# Patient Record
Sex: FEMALE | Race: WHITE | NOT HISPANIC OR LATINO | Employment: OTHER | URBAN - METROPOLITAN AREA
[De-identification: names, ages, dates, MRNs, and addresses within clinical notes are randomized per-mention and may not be internally consistent; named-entity substitution may affect disease eponyms.]

---

## 2021-01-26 ENCOUNTER — LAB VISIT (OUTPATIENT)
Dept: LAB | Facility: HOSPITAL | Age: 56
End: 2021-01-26
Attending: FAMILY MEDICINE
Payer: MEDICAID

## 2021-01-26 DIAGNOSIS — Z12.11 COLON CANCER SCREENING: ICD-10-CM

## 2021-01-26 PROCEDURE — 82274 ASSAY TEST FOR BLOOD FECAL: CPT

## 2021-02-01 LAB — HEMOCCULT STL QL IA: NEGATIVE

## 2022-02-10 ENCOUNTER — PATIENT MESSAGE (OUTPATIENT)
Dept: ADMINISTRATIVE | Facility: HOSPITAL | Age: 57
End: 2022-02-10
Payer: MEDICAID

## 2022-03-04 DIAGNOSIS — Z12.11 COLON CANCER SCREENING: ICD-10-CM

## 2022-03-16 DIAGNOSIS — Z12.31 OTHER SCREENING MAMMOGRAM: ICD-10-CM

## 2022-04-04 ENCOUNTER — PATIENT MESSAGE (OUTPATIENT)
Dept: ADMINISTRATIVE | Facility: HOSPITAL | Age: 57
End: 2022-04-04
Payer: MEDICAID

## 2022-10-03 ENCOUNTER — PATIENT MESSAGE (OUTPATIENT)
Dept: ADMINISTRATIVE | Facility: HOSPITAL | Age: 57
End: 2022-10-03
Payer: MEDICAID

## 2023-01-31 ENCOUNTER — PATIENT MESSAGE (OUTPATIENT)
Dept: OBSTETRICS AND GYNECOLOGY | Facility: HOSPITAL | Age: 58
End: 2023-01-31
Payer: MEDICAID

## 2023-01-31 DIAGNOSIS — Z79.890 HORMONE REPLACEMENT THERAPY (HRT): Primary | ICD-10-CM

## 2023-01-31 RX ORDER — ESTRADIOL 1 MG/1
0.5 TABLET ORAL DAILY
Qty: 60 TABLET | Refills: 2 | Status: SHIPPED | OUTPATIENT
Start: 2023-01-31 | End: 2024-01-10 | Stop reason: SDUPTHER

## 2023-01-31 RX ORDER — ESTRADIOL 1 MG/1
0.5 TABLET ORAL DAILY
Qty: 60 TABLET | Refills: 2 | Status: SHIPPED | OUTPATIENT
Start: 2023-01-31 | End: 2023-01-31

## 2023-02-01 NOTE — TELEPHONE ENCOUNTER
Embx negative. Per discussion with Dr. Daniels at last visit (see note), will start patient on PO HRT and d/c patches. Rx to pharmacy. RTC in 2mo for f/u.

## 2023-03-09 ENCOUNTER — OFFICE VISIT (OUTPATIENT)
Dept: URGENT CARE | Facility: CLINIC | Age: 58
End: 2023-03-09
Payer: MEDICAID

## 2023-03-09 VITALS
DIASTOLIC BLOOD PRESSURE: 99 MMHG | OXYGEN SATURATION: 98 % | RESPIRATION RATE: 16 BRPM | WEIGHT: 141 LBS | SYSTOLIC BLOOD PRESSURE: 152 MMHG | HEIGHT: 65 IN | TEMPERATURE: 99 F | HEART RATE: 96 BPM | BODY MASS INDEX: 23.49 KG/M2

## 2023-03-09 DIAGNOSIS — K13.0 ANGULAR CHEILITIS: Primary | ICD-10-CM

## 2023-03-09 DIAGNOSIS — K14.6 SORENESS OF TONGUE: ICD-10-CM

## 2023-03-09 PROCEDURE — 99203 OFFICE O/P NEW LOW 30 MIN: CPT | Mod: S$GLB,,,

## 2023-03-09 PROCEDURE — 99203 PR OFFICE/OUTPT VISIT, NEW, LEVL III, 30-44 MIN: ICD-10-PCS | Mod: S$GLB,,,

## 2023-03-09 RX ORDER — MUPIROCIN 20 MG/G
OINTMENT TOPICAL DAILY
Qty: 15 G | Refills: 0 | Status: SHIPPED | OUTPATIENT
Start: 2023-03-09 | End: 2023-04-03

## 2023-03-09 RX ORDER — DOXYLAMINE SUCCINATE 25 MG
TABLET ORAL 2 TIMES DAILY
Qty: 57 G | Refills: 0 | Status: SHIPPED | OUTPATIENT
Start: 2023-03-09 | End: 2023-11-03

## 2023-03-09 RX ORDER — HYDROXYZINE HYDROCHLORIDE 25 MG/1
TABLET, FILM COATED ORAL
COMMUNITY
Start: 2022-12-19 | End: 2023-11-03

## 2023-03-09 RX ORDER — FLUCONAZOLE 100 MG/1
TABLET ORAL
Qty: 15 TABLET | Refills: 0 | Status: SHIPPED | OUTPATIENT
Start: 2023-03-09 | End: 2023-04-03

## 2023-03-10 ENCOUNTER — PATIENT MESSAGE (OUTPATIENT)
Dept: OBSTETRICS AND GYNECOLOGY | Facility: CLINIC | Age: 58
End: 2023-03-10
Payer: MEDICAID

## 2023-03-10 NOTE — PROGRESS NOTES
"Subjective:       Patient ID: Humaira Izaguirre is a 57 y.o. female.    Vitals:  height is 5' 5" (1.651 m) and weight is 64 kg (141 lb). Her oral temperature is 98.7 °F (37.1 °C). Her blood pressure is 152/99 (abnormal) and her pulse is 96. Her respiration is 16 and oxygen saturation is 98%.     Chief Complaint: Facial Swelling    Other  This is a recurrent problem. The current episode started today. The problem has been gradually worsening. Pertinent negatives include no congestion, coughing, fever, headaches, nausea or vomiting. Associated symptoms comments: Lip swelling, lips burning  . Treatments tried: hydroxyzine.     Constitution: Negative for fever.   HENT:  Negative for congestion.    Respiratory:  Negative for cough.    Gastrointestinal:  Negative for nausea and vomiting.   Skin:  Positive for lesion. Negative for erythema.   Neurological:  Negative for headaches.     Objective:      Physical Exam   Constitutional: She is oriented to person, place, and time. She appears well-developed.   HENT:   Head: Normocephalic and atraumatic. Head is without abrasion, without contusion and without laceration.       Ears:   Right Ear: External ear normal.   Left Ear: External ear normal.   Nose: Nose normal.   Mouth/Throat: Oropharynx is clear and moist and mucous membranes are normal.   Eyes: Conjunctivae, EOM and lids are normal. Pupils are equal, round, and reactive to light.   Neck: Trachea normal and phonation normal. Neck supple.   Cardiovascular: Normal rate, regular rhythm and normal heart sounds.   Pulmonary/Chest: Effort normal and breath sounds normal. No stridor. No respiratory distress.   Musculoskeletal: Normal range of motion.         General: Normal range of motion.   Neurological: She is alert and oriented to person, place, and time.   Skin: Skin is warm, dry, intact and rash. Capillary refill takes less than 2 seconds. No abrasion, No burn, No bruising, No erythema and No ecchymosis   Psychiatric: " Her speech is normal and behavior is normal. Judgment and thought content normal.   Nursing note and vitals reviewed.      Assessment:       1. Angular cheilitis    2. Soreness of tongue          Plan:         Angular cheilitis  -     miconazole (MICOTIN) 2 % cream; Apply topically 2 (two) times daily.  Dispense: 57 g; Refill: 0  -     fluconazole (DIFLUCAN) 100 MG tablet; Take 2 tablets (200 mg total) by mouth once daily for 1 day, THEN 1 tablet (100 mg total) once daily for 13 days.  Dispense: 15 tablet; Refill: 0  -     mupirocin (BACTROBAN) 2 % ointment; Apply topically once daily. for 14 days  Dispense: 15 g; Refill: 0    Soreness of tongue  -     (Magic mouthwash) 1:1:1 diphenhydrAMINE(Benadryl) 12.5mg/5ml liq, aluminum & magnesium hydroxide-simethicone (Maalox), LIDOcaine viscous 2%; Swish and spit 10 mLs every 4 (four) hours as needed (mouth discomfort). for mouth sores  Dispense: 360 mL; Refill: 0            Inflammation of the angles of the mouth noted.  The creases of the mouth constantly split, crack and bleed.  Severe dry, chapped lips noted.  Fissures, scaling and erythema of the lower lip and lip creases noted.       Discussed plan of care with patient.  They voiced full understanding and are in agreement with the current plan of care.  All known questions and concerns addressed at this time.      You must understand that you have received treatment at an Urgent Care Facility only, and that you may be released before all of your medical problems are known or treated.  Urgent Care facilities are not equipped to handle life threatening emergencies.  It is recommended that you seek care at an Emergency Department for further evaluation of worsening or concerning symptoms, or possibly life threatening conditions as discussed.

## 2023-03-10 NOTE — PATIENT INSTRUCTIONS
Take daily Vitamin B12 and Zinc supplements to help restore the skin to the areas around the mouth and tongue soreness.  Use magic mouth wash daily to help with sore tongue.      - Pt instructed that they can take Tylenol, or acetaminophen for their pain. They can take up to 3,000mg per day, or 6 tablets, with a max of two tablets at a time,  as long as they do not have any liver disease.  -Pt instructed may take Motrin (ibuprofen) as needed every 8 hours for inflammation.

## 2023-12-20 DIAGNOSIS — M81.0 OSTEOPOROSIS WITHOUT CURRENT PATHOLOGICAL FRACTURE, UNSPECIFIED OSTEOPOROSIS TYPE: Primary | ICD-10-CM

## 2023-12-20 RX ORDER — ALENDRONATE SODIUM 70 MG/1
70 TABLET ORAL
Qty: 4 TABLET | Refills: 11 | Status: SHIPPED | OUTPATIENT
Start: 2023-12-20 | End: 2024-12-19

## 2024-10-28 ENCOUNTER — OFFICE VISIT (OUTPATIENT)
Dept: URGENT CARE | Facility: CLINIC | Age: 59
End: 2024-10-28
Payer: MEDICAID

## 2024-10-28 VITALS
RESPIRATION RATE: 18 BRPM | WEIGHT: 130 LBS | SYSTOLIC BLOOD PRESSURE: 158 MMHG | HEIGHT: 65 IN | BODY MASS INDEX: 21.66 KG/M2 | OXYGEN SATURATION: 97 % | DIASTOLIC BLOOD PRESSURE: 92 MMHG | TEMPERATURE: 98 F | HEART RATE: 93 BPM

## 2024-10-28 DIAGNOSIS — J02.9 ACUTE PHARYNGITIS, UNSPECIFIED ETIOLOGY: Primary | ICD-10-CM

## 2024-10-28 LAB
CTP QC/QA: YES
MOLECULAR STREP A: NEGATIVE
POC MOLECULAR INFLUENZA A AGN: NEGATIVE
POC MOLECULAR INFLUENZA B AGN: NEGATIVE
SARS-COV-2 AG RESP QL IA.RAPID: NEGATIVE

## 2024-10-28 PROCEDURE — 87502 INFLUENZA DNA AMP PROBE: CPT | Mod: QW,S$GLB,, | Performed by: NURSE PRACTITIONER

## 2024-10-28 PROCEDURE — 87651 STREP A DNA AMP PROBE: CPT | Mod: QW,S$GLB,, | Performed by: NURSE PRACTITIONER

## 2024-10-28 PROCEDURE — 87811 SARS-COV-2 COVID19 W/OPTIC: CPT | Mod: QW,S$GLB,, | Performed by: NURSE PRACTITIONER

## 2024-10-28 PROCEDURE — 99214 OFFICE O/P EST MOD 30 MIN: CPT | Mod: 95,S$GLB,, | Performed by: NURSE PRACTITIONER

## 2024-12-10 ENCOUNTER — TELEPHONE (OUTPATIENT)
Dept: OBSTETRICS AND GYNECOLOGY | Facility: HOSPITAL | Age: 59
End: 2024-12-10
Payer: MEDICAID

## 2024-12-10 NOTE — TELEPHONE ENCOUNTER
Discussed results of TVUS with patient. She has a history of PMB with endometrial stripe thickened to 5-6mm. Patient is scheduled for in-office endometrial biopsy on Friday.   Discussed the need for further evaluation with hysteroscopy/D&C pending the endometrial biopsy results.   Patient is understanding and in agreement.     Brooke Fleming MD  Obstetrics & Gynecology, PGY-1

## 2025-04-25 ENCOUNTER — OFFICE VISIT (OUTPATIENT)
Dept: URGENT CARE | Facility: CLINIC | Age: 60
End: 2025-04-25
Payer: MEDICAID

## 2025-04-25 VITALS
WEIGHT: 135 LBS | HEART RATE: 104 BPM | HEIGHT: 65 IN | OXYGEN SATURATION: 98 % | RESPIRATION RATE: 19 BRPM | TEMPERATURE: 99 F | BODY MASS INDEX: 22.49 KG/M2 | DIASTOLIC BLOOD PRESSURE: 93 MMHG | SYSTOLIC BLOOD PRESSURE: 134 MMHG

## 2025-04-25 DIAGNOSIS — J06.9 VIRAL URI: ICD-10-CM

## 2025-04-25 DIAGNOSIS — J02.9 ACUTE VIRAL PHARYNGITIS: Primary | ICD-10-CM

## 2025-04-25 DIAGNOSIS — R03.0 ELEVATED BLOOD PRESSURE READING: ICD-10-CM

## 2025-04-25 DIAGNOSIS — J02.9 SORE THROAT: ICD-10-CM

## 2025-04-25 LAB
CTP QC/QA: YES
CTP QC/QA: YES
MOLECULAR STREP A: NEGATIVE
SARS CORONAVIRUS 2 ANTIGEN: NEGATIVE

## 2025-04-25 RX ORDER — AZELASTINE 1 MG/ML
1 SPRAY, METERED NASAL 2 TIMES DAILY
Qty: 30 ML | Refills: 0 | Status: SHIPPED | OUTPATIENT
Start: 2025-04-25 | End: 2026-04-25

## 2025-04-25 RX ORDER — HYDROXYZINE HYDROCHLORIDE 25 MG/1
25 TABLET, FILM COATED ORAL
COMMUNITY
Start: 2025-04-09

## 2025-04-25 RX ORDER — GUAIFENESIN AND DEXTROMETHORPHAN HYDROBROMIDE 1200; 60 MG/1; MG/1
1 TABLET, EXTENDED RELEASE ORAL 2 TIMES DAILY
Qty: 20 TABLET | Refills: 0 | Status: SHIPPED | OUTPATIENT
Start: 2025-04-25 | End: 2025-05-05

## 2025-04-25 NOTE — PROGRESS NOTES
"Subjective:      Patient ID: Humaira Izaguirre is a 59 y.o. female.    Vitals:  height is 5' 5" (1.651 m) and weight is 61.2 kg (135 lb). Her oral temperature is 98.5 °F (36.9 °C). Her blood pressure is 134/93 (abnormal) and her pulse is 104. Her respiration is 19 and oxygen saturation is 98%.     Chief Complaint: Sore Throat    Patient states she started yesterday with a sore throat. Today worse with headache and congestion.    Sore Throat   This is a new problem. The current episode started yesterday. The problem has been gradually worsening. Neither side of throat is experiencing more pain than the other. There has been no fever. The pain is at a severity of 8/10. The pain is moderate. Associated symptoms include congestion and headaches. Treatments tried: Zyrtec. The treatment provided no relief.       HENT:  Positive for congestion and sore throat.    Neurological:  Positive for headaches.      Objective:     Physical Exam   Constitutional: She is oriented to person, place, and time. She appears well-developed. She is cooperative.  Non-toxic appearance. She does not appear ill. No distress.   HENT:   Head: Normocephalic and atraumatic.   Ears:   Right Ear: Hearing, tympanic membrane, external ear and ear canal normal. no impacted cerumen  Left Ear: Hearing, external ear and ear canal normal. no impacted cerumen  Nose: Nose normal. No rhinorrhea or congestion.   Mouth/Throat: Mucous membranes are moist. Posterior oropharyngeal erythema present. No oropharyngeal exudate. Oropharynx is clear.   Eyes: Conjunctivae and lids are normal. No scleral icterus.   Neck: Phonation normal. Neck supple.   Cardiovascular: Normal rate, regular rhythm and normal heart sounds.   No murmur heard.Exam reveals no gallop and no friction rub.   Pulmonary/Chest: Effort normal and breath sounds normal. No stridor. No respiratory distress. She has no wheezes. She has no rhonchi. She has no rales.   Abdominal: Normal appearance. "   Neurological: She is alert and oriented to person, place, and time. Coordination normal.   Skin: Skin is intact, not diaphoretic and not pale.   Psychiatric: Her speech is normal and behavior is normal. Judgment and thought content normal.   Nursing note and vitals reviewed.      Assessment:     1. Acute viral pharyngitis    2. Sore throat    3. Viral URI    4. Elevated blood pressure reading        Plan:       Acute viral pharyngitis  -     azelastine (ASTELIN) 137 mcg (0.1 %) nasal spray; 1 spray (137 mcg total) by Nasal route 2 (two) times daily.  Dispense: 30 mL; Refill: 0  -     dextromethorphan-guaiFENesin (MUCINEX DM) 60-1,200 mg per 12 hr tablet; Take 1 tablet by mouth 2 (two) times daily. for 10 days  Dispense: 20 tablet; Refill: 0    Sore throat  -     SARS Coronavirus 2 Antigen, POCT Manual Read  -     POCT Strep A, Molecular    Viral URI  -     azelastine (ASTELIN) 137 mcg (0.1 %) nasal spray; 1 spray (137 mcg total) by Nasal route 2 (two) times daily.  Dispense: 30 mL; Refill: 0  -     dextromethorphan-guaiFENesin (MUCINEX DM) 60-1,200 mg per 12 hr tablet; Take 1 tablet by mouth 2 (two) times daily. for 10 days  Dispense: 20 tablet; Refill: 0    Elevated blood pressure reading      Results for orders placed or performed in visit on 04/25/25   POCT Strep A, Molecular    Collection Time: 04/25/25  2:32 PM   Result Value Ref Range    Molecular Strep A, POC Negative Negative     Acceptable Yes    SARS Coronavirus 2 Antigen, POCT Manual Read    Collection Time: 04/25/25  2:38 PM   Result Value Ref Range    SARS Coronavirus 2 Antigen Negative Negative, Presumptive Negative     Acceptable Yes      Continue zyrtec. Alternate ibuprofen and tylenol as needed for fever/pain/body aches every 4-6 hours. Rest, increase PO fluids.   Discussed with patient the importance of f/u with their primary care provider. Urged to go to the ER for any worsening signs or symptoms.